# Patient Record
Sex: FEMALE | Race: WHITE | NOT HISPANIC OR LATINO | Employment: UNEMPLOYED | URBAN - METROPOLITAN AREA
[De-identification: names, ages, dates, MRNs, and addresses within clinical notes are randomized per-mention and may not be internally consistent; named-entity substitution may affect disease eponyms.]

---

## 2017-07-26 ENCOUNTER — TRANSCRIBE ORDERS (OUTPATIENT)
Dept: ADMINISTRATIVE | Facility: HOSPITAL | Age: 42
End: 2017-07-26

## 2017-07-26 DIAGNOSIS — D35.2 PITUITARY MICROADENOMA (HCC): Primary | ICD-10-CM

## 2017-07-27 ENCOUNTER — TRANSCRIBE ORDERS (OUTPATIENT)
Dept: MAMMOGRAPHY | Facility: HOSPITAL | Age: 42
End: 2017-07-27

## 2017-07-27 DIAGNOSIS — Z12.31 VISIT FOR SCREENING MAMMOGRAM: Primary | ICD-10-CM

## 2017-08-01 ENCOUNTER — HOSPITAL ENCOUNTER (OUTPATIENT)
Dept: MRI IMAGING | Facility: HOSPITAL | Age: 42
Discharge: HOME | End: 2017-08-01
Attending: OBSTETRICS & GYNECOLOGY | Admitting: OBSTETRICS & GYNECOLOGY

## 2017-08-01 DIAGNOSIS — D35.2 PITUITARY MICROADENOMA (HCC): ICD-10-CM

## 2017-08-01 PROCEDURE — A9577 INJ MULTIHANCE: HCPCS | Performed by: OBSTETRICS & GYNECOLOGY

## 2017-08-01 PROCEDURE — 0 GADOBENATE DIMEGLUMINE 529 MG/ML SOLUTION: Performed by: OBSTETRICS & GYNECOLOGY

## 2017-08-01 PROCEDURE — 70553 MRI BRAIN STEM W/O & W/DYE: CPT

## 2017-08-01 RX ADMIN — GADOBENATE DIMEGLUMINE 15 ML: 529 INJECTION, SOLUTION INTRAVENOUS at 15:45

## 2017-08-10 ENCOUNTER — HOSPITAL ENCOUNTER (OUTPATIENT)
Dept: MAMMOGRAPHY | Facility: HOSPITAL | Age: 42
Discharge: HOME | End: 2017-08-10
Attending: OBSTETRICS & GYNECOLOGY | Admitting: OBSTETRICS & GYNECOLOGY

## 2017-08-10 DIAGNOSIS — Z12.31 VISIT FOR SCREENING MAMMOGRAM: ICD-10-CM

## 2017-08-10 PROCEDURE — 77063 BREAST TOMOSYNTHESIS BI: CPT

## 2017-08-10 PROCEDURE — 77063 BREAST TOMOSYNTHESIS BI: CPT | Performed by: RADIOLOGY

## 2017-08-10 PROCEDURE — G0202 SCR MAMMO BI INCL CAD: HCPCS

## 2017-08-10 PROCEDURE — 77067 SCR MAMMO BI INCL CAD: CPT | Performed by: RADIOLOGY

## 2018-06-29 ENCOUNTER — TRANSCRIBE ORDERS (OUTPATIENT)
Dept: ADMINISTRATIVE | Facility: HOSPITAL | Age: 43
End: 2018-06-29

## 2018-06-29 DIAGNOSIS — Z12.31 VISIT FOR SCREENING MAMMOGRAM: Primary | ICD-10-CM

## 2018-07-29 PROCEDURE — 87086 URINE CULTURE/COLONY COUNT: CPT | Performed by: FAMILY MEDICINE

## 2018-07-30 ENCOUNTER — TELEPHONE (OUTPATIENT)
Dept: URGENT CARE | Facility: CLINIC | Age: 43
End: 2018-07-30

## 2018-07-31 ENCOUNTER — TELEPHONE (OUTPATIENT)
Dept: URGENT CARE | Facility: CLINIC | Age: 43
End: 2018-07-31

## 2018-08-13 ENCOUNTER — HOSPITAL ENCOUNTER (OUTPATIENT)
Dept: MAMMOGRAPHY | Facility: HOSPITAL | Age: 43
Discharge: HOME | End: 2018-08-13
Attending: OBSTETRICS & GYNECOLOGY | Admitting: OBSTETRICS & GYNECOLOGY

## 2018-08-13 DIAGNOSIS — Z12.31 VISIT FOR SCREENING MAMMOGRAM: ICD-10-CM

## 2018-08-13 PROCEDURE — 77063 BREAST TOMOSYNTHESIS BI: CPT

## 2018-08-13 PROCEDURE — 77067 SCR MAMMO BI INCL CAD: CPT | Performed by: RADIOLOGY

## 2018-08-13 PROCEDURE — 77063 BREAST TOMOSYNTHESIS BI: CPT | Performed by: RADIOLOGY

## 2018-08-13 PROCEDURE — 77067 SCR MAMMO BI INCL CAD: CPT

## 2018-08-30 PROCEDURE — 87086 URINE CULTURE/COLONY COUNT: CPT | Performed by: PHYSICIAN ASSISTANT

## 2018-09-01 ENCOUNTER — TELEPHONE (OUTPATIENT)
Dept: URGENT CARE | Facility: CLINIC | Age: 43
End: 2018-09-01

## 2019-07-05 ENCOUNTER — TRANSCRIBE ORDERS (OUTPATIENT)
Dept: ADMINISTRATIVE | Facility: HOSPITAL | Age: 44
End: 2019-07-05

## 2019-07-05 DIAGNOSIS — Z12.31 VISIT FOR SCREENING MAMMOGRAM: Primary | ICD-10-CM

## 2019-08-30 ENCOUNTER — HOSPITAL ENCOUNTER (OUTPATIENT)
Dept: MAMMOGRAPHY | Facility: HOSPITAL | Age: 44
Discharge: HOME | End: 2019-08-30
Admitting: OBSTETRICS & GYNECOLOGY

## 2019-08-30 DIAGNOSIS — Z12.31 VISIT FOR SCREENING MAMMOGRAM: ICD-10-CM

## 2019-08-30 PROCEDURE — 77063 BREAST TOMOSYNTHESIS BI: CPT | Performed by: RADIOLOGY

## 2019-08-30 PROCEDURE — 77067 SCR MAMMO BI INCL CAD: CPT | Performed by: RADIOLOGY

## 2019-08-30 PROCEDURE — 77067 SCR MAMMO BI INCL CAD: CPT

## 2019-08-30 PROCEDURE — 77063 BREAST TOMOSYNTHESIS BI: CPT

## 2020-07-16 ENCOUNTER — TRANSCRIBE ORDERS (OUTPATIENT)
Dept: ADMINISTRATIVE | Facility: HOSPITAL | Age: 45
End: 2020-07-16

## 2020-07-16 DIAGNOSIS — Z12.31 VISIT FOR SCREENING MAMMOGRAM: Primary | ICD-10-CM

## 2020-09-02 ENCOUNTER — OFFICE VISIT (OUTPATIENT)
Dept: OBSTETRICS AND GYNECOLOGY | Facility: CLINIC | Age: 45
End: 2020-09-02

## 2020-09-02 VITALS
SYSTOLIC BLOOD PRESSURE: 112 MMHG | DIASTOLIC BLOOD PRESSURE: 82 MMHG | HEIGHT: 68 IN | WEIGHT: 163 LBS | BODY MASS INDEX: 24.71 KG/M2

## 2020-09-02 DIAGNOSIS — Z01.419 WELL WOMAN EXAM: ICD-10-CM

## 2020-09-02 DIAGNOSIS — D36.9 MICROADENOMA: Primary | ICD-10-CM

## 2020-09-02 DIAGNOSIS — F41.9 ANXIETY: ICD-10-CM

## 2020-09-02 DIAGNOSIS — D35.2 PITUITARY MICROADENOMA (HCC): ICD-10-CM

## 2020-09-02 DIAGNOSIS — N94.6 DYSMENORRHEA: ICD-10-CM

## 2020-09-02 PROCEDURE — 99212 OFFICE O/P EST SF 10 MIN: CPT | Performed by: OBSTETRICS & GYNECOLOGY

## 2020-09-02 PROCEDURE — 99396 PREV VISIT EST AGE 40-64: CPT | Performed by: OBSTETRICS & GYNECOLOGY

## 2020-09-02 RX ORDER — BUPROPION HYDROCHLORIDE 100 MG/1
150 TABLET ORAL DAILY
Qty: 30 TABLET | Refills: 5 | Status: SHIPPED | OUTPATIENT
Start: 2020-09-02 | End: 2020-09-09

## 2020-09-02 RX ORDER — CABERGOLINE 0.5 MG/1
0.5 TABLET ORAL 2 TIMES WEEKLY
Qty: 60 TABLET | Refills: 5 | Status: SHIPPED | OUTPATIENT
Start: 2020-09-03 | End: 2021-09-14

## 2020-09-02 RX ORDER — IBUPROFEN 600 MG/1
600 TABLET ORAL EVERY 6 HOURS PRN
Qty: 40 TABLET | Refills: 0 | Status: SHIPPED | OUTPATIENT
Start: 2020-09-02 | End: 2021-09-23 | Stop reason: SDUPTHER

## 2020-09-02 NOTE — PROGRESS NOTES
GYN Annual Exam     CC - Here for annual exam.     Subjective   HPI  Cayla Saldana is a 44 y.o. female, , who presents for annual well woman exam. Patient's last menstrual period was 2020 (exact date)..  Periods are regular every 28-30 days, lasting 3 days.  Dysmenorrhea:mild, occurring throughout menses.  Patient reports problems with: none.  Partner Status: Marital Status: .  New Partners since last visit: no.  Desires STD Screening: no.    Patient requests RF on ibuprofen 600mg, dostinex and wellbutrin today. She is taking compouned PO progesterone per outside provider.     Additional OB/GYN History   Current contraception: contraceptive methods: Vasectomy   Desires to: do not start contraception  Last Pap :   Last Completed Pap Smear       Status Date      PAP SMEAR Done 2020 negative        History of abnormal Pap smear: yes - h/o HSIL, colpo  and , pt with h/o LEEP   Family history of uterine, colon, breast, or ovarian cancer: yes - maternal aunt with h/o breast CA at age 35  Performs monthly Self-Breast Exam: yes  Last mammogram:   Last Completed Mammogram       Status Date      MAMMOGRAM Done 2019 MAMMO SCREENING DIGITAL TOMOSYNTHESIS BILATERAL W CAD     Patient has more history with this topic...         Exercises Regularly: yes  Feelings of Anxiety or Depression: yes - controlled with wellbutrin  Tobacco Usage?: No   OB History        3    Para   3    Term   3            AB        Living           SAB        TAB        Ectopic        Molar        Multiple        Live Births                    Health Maintenance   Topic Date Due   • Annual Gynecologic Pelvic and Breast Exam  1974   • ANNUAL PHYSICAL  10/31/1977   • TDAP/TD VACCINES (1 - Tdap) 10/31/1985   • INFLUENZA VACCINE  2020   • HEPATITIS C SCREENING  2020   • MAMMOGRAM  2020   • COLONOSCOPY  2020   • PAP SMEAR  2023       The additional following portions  "of the patient's history were reviewed and updated as appropriate: allergies, current medications, past family history, past medical history, past social history, past surgical history and problem list.    Review of Systems   Constitutional: Negative.    HENT: Negative.    Eyes: Negative.    Respiratory: Negative.    Cardiovascular: Negative.    Gastrointestinal: Negative.    Endocrine: Negative.    Genitourinary: Negative.    Musculoskeletal: Negative.    Skin: Negative.    Allergic/Immunologic: Negative.    Neurological: Negative.    Hematological: Negative.    Psychiatric/Behavioral: Negative.        I have reviewed and agree with the HPI, ROS, and historical information as entered above. Ashlee Panda MD    Objective   /82   Ht 172.7 cm (68\")   Wt 73.9 kg (163 lb)   LMP 08/23/2020 (Exact Date)   Breastfeeding No   BMI 24.78 kg/m²     Physical Exam   Constitutional: She is oriented to person, place, and time. She appears well-developed and well-nourished.   HENT:   Head: Normocephalic and atraumatic.   Neck: Normal range of motion. No muscular tenderness present. No thyroid mass and no thyromegaly present.   Cardiovascular: Normal rate and regular rhythm.   No murmur heard.  Pulmonary/Chest: Effort normal and breath sounds normal. She has no wheezes. She has no rhonchi. She has no rales. She exhibits no mass, no tenderness and no retraction. Right breast exhibits no mass, no nipple discharge, no skin change and no tenderness. Left breast exhibits no mass, no nipple discharge, no skin change and no tenderness.   Abdominal: Soft. Bowel sounds are normal. She exhibits no mass. There is no tenderness. There is no rigidity and no guarding. No hernia. Hernia confirmed negative in the right inguinal area and confirmed negative in the left inguinal area.   Genitourinary: Vagina normal, uterus normal and cervix normal. Rectal exam shows no external hemorrhoid. There is no rash, tenderness or lesion on " the right labia. There is no rash, tenderness or lesion on the left labia. Right adnexum displays no mass and no tenderness. Left adnexum displays no mass and no tenderness. Vagina exhibits no lesion. No vaginal discharge found.   Genitourinary Comments: Chaperone Present   Lymphadenopathy:        Right: No inguinal adenopathy present.        Left: No inguinal adenopathy present.   Neurological: She is alert and oriented to person, place, and time.   Psychiatric: She has a normal mood and affect. Her behavior is normal.   Nursing note and vitals reviewed.      Assessment/Plan     Assessment     Problem List Items Addressed This Visit        Endocrine    Pituitary microadenoma (CMS/HCC)    Overview     On dostinex. Stable. Prolactin checked 9/2/20         Relevant Medications    cabergoline (DOSTINEX) 0.5 MG tablet (Start on 9/3/2020)       Nervous and Auditory    Dysmenorrhea    Overview     Uses ibuprofen. Understands risks of overuse.          Relevant Medications    ibuprofen (ADVIL,MOTRIN) 600 MG tablet       Other    Anxiety    Overview     Stable on well butrin           Other Visit Diagnoses     Microadenoma    -  Primary    Relevant Orders    Prolactin    Well woman exam        Relevant Orders    Pap IG, Rfx HPV ASCU    Mammo Screening Digital Tomosynthesis Bilateral With CAD          1. GYN annual well woman exam.     2.  Microadenoma  Plan   1. With her microadenoma last MRI was in 2015 that did not show microadenoma at that time.  She has maintained on Dostinex without symptoms.  Her prolactin will be checked today.  1. Recommended use of Vitamin D replacement and getting adequate calcium in her diet. (1500mg)  2. Reviewed monthly self breast exams.  Instructed to call with lumps, pain, or breast discharge.    3. Ordered Mammogram today  4. Reviewed exercise as a preventative health measures.   5. RTC in 1 year or PRN with problems.  6. Symptoms of menopausal transition reviewed with patient.       Ashlee  Cally Panda MD  09/02/2020

## 2020-09-03 LAB — PROLACTIN SERPL-MCNC: 7.3 NG/ML (ref 4.8–23.3)

## 2020-09-09 RX ORDER — BUPROPION HYDROCHLORIDE 150 MG/1
150 TABLET, EXTENDED RELEASE ORAL DAILY
Qty: 30 TABLET | Refills: 1 | Status: SHIPPED | OUTPATIENT
Start: 2020-09-09 | End: 2020-10-02 | Stop reason: SDUPTHER

## 2020-09-09 RX ORDER — BUPROPION HYDROCHLORIDE 150 MG/1
1 TABLET, EXTENDED RELEASE ORAL DAILY
COMMUNITY
Start: 2020-08-07 | End: 2020-09-09 | Stop reason: SDUPTHER

## 2020-09-09 NOTE — TELEPHONE ENCOUNTER
Pt called requesting this medication, states we sent in wellbutrin 100mg and that was wrong, states she needs the 150mg SR. Last seen 12/10/2019 -

## 2020-09-21 DIAGNOSIS — Z01.419 WELL WOMAN EXAM: ICD-10-CM

## 2020-10-02 RX ORDER — BUPROPION HYDROCHLORIDE 150 MG/1
150 TABLET, EXTENDED RELEASE ORAL DAILY
Qty: 30 TABLET | Refills: 1 | Status: SHIPPED | OUTPATIENT
Start: 2020-10-02 | End: 2021-01-04

## 2020-10-17 DIAGNOSIS — D35.2 PITUITARY MICROADENOMA (HCC): ICD-10-CM

## 2020-10-19 RX ORDER — CABERGOLINE 0.5 MG/1
TABLET ORAL
Qty: 8 TABLET | Refills: 4 | OUTPATIENT
Start: 2020-10-19

## 2020-10-30 ENCOUNTER — HOSPITAL ENCOUNTER (OUTPATIENT)
Dept: MAMMOGRAPHY | Facility: HOSPITAL | Age: 45
Discharge: HOME | End: 2020-10-30
Admitting: OBSTETRICS & GYNECOLOGY

## 2020-10-30 DIAGNOSIS — Z12.31 VISIT FOR SCREENING MAMMOGRAM: ICD-10-CM

## 2020-10-30 PROCEDURE — 77063 BREAST TOMOSYNTHESIS BI: CPT | Performed by: RADIOLOGY

## 2020-10-30 PROCEDURE — 77067 SCR MAMMO BI INCL CAD: CPT | Performed by: RADIOLOGY

## 2020-10-30 PROCEDURE — 77067 SCR MAMMO BI INCL CAD: CPT

## 2020-10-30 PROCEDURE — 77063 BREAST TOMOSYNTHESIS BI: CPT

## 2020-12-30 ENCOUNTER — TRANSCRIBE ORDERS (OUTPATIENT)
Dept: ADMINISTRATIVE | Facility: HOSPITAL | Age: 45
End: 2020-12-30

## 2020-12-30 DIAGNOSIS — Z12.31 VISIT FOR SCREENING MAMMOGRAM: Primary | ICD-10-CM

## 2021-01-04 RX ORDER — BUPROPION HYDROCHLORIDE 150 MG/1
TABLET, EXTENDED RELEASE ORAL
Qty: 30 TABLET | Refills: 8 | Status: SHIPPED | OUTPATIENT
Start: 2021-01-04 | End: 2021-09-13

## 2021-07-08 NOTE — TELEPHONE ENCOUNTER
Pt called saying her abx  Macrobid is giving her diarrhea. Wants to know if we can change her abx.   Use Map Statement For Sites (Optional): No

## 2021-09-13 DIAGNOSIS — D35.2 PITUITARY MICROADENOMA (HCC): ICD-10-CM

## 2021-09-13 RX ORDER — BUPROPION HYDROCHLORIDE 150 MG/1
TABLET, EXTENDED RELEASE ORAL
Qty: 30 TABLET | Refills: 0 | Status: SHIPPED | OUTPATIENT
Start: 2021-09-13 | End: 2021-09-23 | Stop reason: SDUPTHER

## 2021-09-14 RX ORDER — CABERGOLINE 0.5 MG/1
0.5 TABLET ORAL 2 TIMES WEEKLY
Qty: 8 TABLET | Refills: 0 | Status: SHIPPED | OUTPATIENT
Start: 2021-09-16 | End: 2021-09-23 | Stop reason: SDUPTHER

## 2021-09-23 ENCOUNTER — OFFICE VISIT (OUTPATIENT)
Dept: OBSTETRICS AND GYNECOLOGY | Facility: CLINIC | Age: 46
End: 2021-09-23

## 2021-09-23 VITALS
SYSTOLIC BLOOD PRESSURE: 101 MMHG | DIASTOLIC BLOOD PRESSURE: 59 MMHG | BODY MASS INDEX: 22.31 KG/M2 | WEIGHT: 147.2 LBS | HEIGHT: 68 IN

## 2021-09-23 DIAGNOSIS — N94.6 DYSMENORRHEA: ICD-10-CM

## 2021-09-23 DIAGNOSIS — Z12.31 BREAST CANCER SCREENING BY MAMMOGRAM: ICD-10-CM

## 2021-09-23 DIAGNOSIS — F41.9 ANXIETY: ICD-10-CM

## 2021-09-23 DIAGNOSIS — D35.2 PITUITARY MICROADENOMA (HCC): ICD-10-CM

## 2021-09-23 DIAGNOSIS — Z01.419 WOMEN'S ANNUAL ROUTINE GYNECOLOGICAL EXAMINATION: ICD-10-CM

## 2021-09-23 DIAGNOSIS — N92.6 MENSTRUAL IRREGULARITY: ICD-10-CM

## 2021-09-23 PROCEDURE — 99396 PREV VISIT EST AGE 40-64: CPT | Performed by: OBSTETRICS & GYNECOLOGY

## 2021-09-23 RX ORDER — CABERGOLINE 0.5 MG/1
0.5 TABLET ORAL 2 TIMES WEEKLY
Qty: 8 TABLET | Refills: 12 | Status: SHIPPED | OUTPATIENT
Start: 2021-09-23 | End: 2022-09-27 | Stop reason: SDUPTHER

## 2021-09-23 RX ORDER — BUPROPION HYDROCHLORIDE 150 MG/1
150 TABLET, EXTENDED RELEASE ORAL DAILY
Qty: 30 TABLET | Refills: 11 | Status: SHIPPED | OUTPATIENT
Start: 2021-09-23 | End: 2022-08-11

## 2021-09-23 RX ORDER — IBUPROFEN 600 MG/1
600 TABLET ORAL EVERY 6 HOURS PRN
Qty: 40 TABLET | Refills: 1 | Status: SHIPPED | OUTPATIENT
Start: 2021-09-23 | End: 2022-09-27 | Stop reason: SDUPTHER

## 2021-09-23 RX ORDER — LEVOTHYROXINE, LIOTHYRONINE 19; 4.5 UG/1; UG/1
TABLET ORAL
COMMUNITY
Start: 2021-09-20

## 2021-09-23 RX ORDER — TRETINOIN 0.1 %
CREAM (GRAM) TOPICAL
COMMUNITY
Start: 2021-09-07

## 2021-09-23 NOTE — PROGRESS NOTES
GYN Annual Exam     CC - Here for annual exam.        HPI  Cayla Saldana is a 45 y.o. female, , who presents for annual well woman exam. Patient's last menstrual period was 2021..  Periods are irregular occurring every 3-8 weeks, lasting 4 days. .  Dysmenorrhea:moderate, occurring first 1-2 days of flow.  Patient reports problems with: irregular periods over the last year.  Periods have ranged from 20 to 60 days.  She is seeing Guerita Huff for HRT.  She is on Testerone Cream and Progesterone PO  . Since her last visit she has started on Testerone Cream. Partner Status: Marital Status: .  New Partners since last visit: no.  Desires STD Screening: no.    Additional OB/GYN History   Current contraception: contraceptive methods: Vasectomy   Desires to: do not start contraception  Last Pap :   Last Completed Pap Smear          Ordered - PAP SMEAR (Every 3 Years) Ordered on 2020  Pap IG, Rfx HPV ASCU    2020  Done - negative              History of abnormal Pap smear: yes - LEEP 2014  Family history of uterine, colon, breast, or ovarian cancer: yes - Breast cancer maternal aunt  Performs monthly Self-Breast Exam: yes  Last mammogram: 10/30/20. Done at .    Last Completed Mammogram          Scheduled - MAMMOGRAM (Yearly) Scheduled for 2021    10/30/2020  Mammo Screening Digital Tomosynthesis Bilateral With CAD    2019  Mammo Screening Digital Tomosynthesis Bilateral With CAD    2018  Mammo Screening Digital Tomosynthesis Bilateral With CAD    08/10/2017  Mammo Screening Digital Tomosynthesis Bilateral With CAD    2016  Mammo screening bilateral               Exercises Regularly: yes  Feelings of Anxiety or Depression: no  Tobacco Usage?: No   OB History        3    Para   3    Term   3            AB        Living   3       SAB        TAB        Ectopic        Molar        Multiple        Live Births                    Health  "Maintenance   Topic Date Due   • COLORECTAL CANCER SCREENING  Never done   • ANNUAL PHYSICAL  Never done   • TDAP/TD VACCINES (1 - Tdap) Never done   • HEPATITIS C SCREENING  Never done   • Annual Gynecologic Pelvic and Breast Exam  09/03/2021   • INFLUENZA VACCINE  10/01/2021   • MAMMOGRAM  10/30/2021   • PAP SMEAR  09/02/2023   • COVID-19 Vaccine  Completed   • Pneumococcal Vaccine 0-64  Aged Out       The additional following portions of the patient's history were reviewed and updated as appropriate: allergies, current medications, past family history, past medical history, past social history, past surgical history and problem list.    Review of Systems   Constitutional: Negative.    HENT: Negative.    Eyes: Negative.    Respiratory: Negative.    Cardiovascular: Negative.    Gastrointestinal: Negative.    Endocrine: Negative.    Genitourinary: Positive for menstrual problem.   Musculoskeletal: Negative.    Skin: Negative.    Allergic/Immunologic: Negative.    Neurological: Negative.    Hematological: Negative.    Psychiatric/Behavioral: Negative.          I have reviewed and agree with the HPI, ROS, and historical information as entered above. Ashlee Panda MD    Objective   /59   Ht 172.7 cm (68\")   Wt 66.8 kg (147 lb 3.2 oz)   LMP 08/30/2021   BMI 22.38 kg/m²     Physical Exam  Vitals and nursing note reviewed. Exam conducted with a chaperone present.   Constitutional:       Appearance: She is well-developed.   HENT:      Head: Normocephalic and atraumatic.   Neck:      Thyroid: No thyroid mass or thyromegaly.   Cardiovascular:      Rate and Rhythm: Normal rate and regular rhythm.      Heart sounds: No murmur heard.      Pulmonary:      Effort: Pulmonary effort is normal. No retractions.      Breath sounds: Normal breath sounds. No wheezing, rhonchi or rales.   Chest:      Chest wall: No mass or tenderness.   Breasts:      Right: Normal. No mass, nipple discharge, skin change or tenderness. "      Left: Normal. No mass, nipple discharge, skin change or tenderness.       Abdominal:      General: Bowel sounds are normal.      Palpations: Abdomen is soft. Abdomen is not rigid. There is no mass.      Tenderness: There is no abdominal tenderness. There is no guarding.      Hernia: No hernia is present. There is no hernia in the left inguinal area or right inguinal area.   Genitourinary:     General: Normal vulva.      Exam position: Lithotomy position.      Pubic Area: No rash.       Labia:         Right: No rash, tenderness or lesion.         Left: No rash, tenderness or lesion.       Urethra: No urethral pain or urethral swelling.      Vagina: Normal. No vaginal discharge or lesions.      Cervix: No cervical motion tenderness, discharge, lesion or cervical bleeding.      Uterus: Normal. Not enlarged, not fixed and not tender.       Adnexa:         Right: No mass, tenderness or fullness.          Left: No mass, tenderness or fullness.        Rectum: No external hemorrhoid.   Musculoskeletal:      Cervical back: Normal range of motion. No muscular tenderness.   Neurological:      Mental Status: She is alert and oriented to person, place, and time.   Psychiatric:         Behavior: Behavior normal.            Assessment and Plan    Problem List Items Addressed This Visit        Genitourinary and Reproductive     Dysmenorrhea    Overview     Uses ibuprofen. Understands risks of overuse.          Relevant Medications    ibuprofen (ADVIL,MOTRIN) 600 MG tablet    Menstrual irregularity    Overview     Most likely menopausal transition, however patient has a history of microadenoma.  We will test MRI to make sure it is not worsening and prolactin level.  We will also draw labs for menopausal transition along with thyroid testing.         Relevant Orders    TSH    Follicle Stimulating Hormone    Estradiol       Hematology and Neoplasia    Pituitary microadenoma (CMS/HCC)    Overview     On dostinex. Stable. Prolactin  checked 9/2/20         Relevant Orders    MRI Brain With & Without Contrast    Comprehensive Metabolic Panel       Mental Health    Anxiety    Overview     Stable on well butrin         Relevant Medications    buPROPion SR (WELLBUTRIN SR) 150 MG 12 hr tablet      Other Visit Diagnoses     Women's annual routine gynecological examination        Relevant Orders    Pap IG, HPV-hr    Breast cancer screening by mammogram        Relevant Orders    Mammo Screening Digital Tomosynthesis Bilateral With CAD          1. GYN annual well woman exam.   2. Reviewed monthly self breast exams.  Instructed to call with lumps, pain, or breast discharge.    3. Ordered Mammogram today  4. Recommended use of Vitamin D replacement and getting adequate calcium in her diet. (1500mg)  5. Reviewed exercise as a preventative health measures.   6. Symptoms of menopausal transition reviewed with patient.   7. RTC in 1 year or PRN with problems.  8. No follow-ups on file.     Ashlee Panda MD  09/23/2021

## 2021-09-24 LAB
ESTRADIOL SERPL-MCNC: 258 PG/ML
FSH SERPL-ACNC: 10 MIU/ML
PROLACTIN SERPL-MCNC: 11.8 NG/ML (ref 4.8–23.3)
TSH SERPL DL<=0.005 MIU/L-ACNC: 0.21 UIU/ML (ref 0.27–4.2)

## 2021-09-27 DIAGNOSIS — E03.8 TSH DEFICIENCY: Primary | ICD-10-CM

## 2021-09-27 DIAGNOSIS — Z12.11 ENCOUNTER FOR SCREENING COLONOSCOPY: Primary | ICD-10-CM

## 2021-09-27 DIAGNOSIS — D35.2 PITUITARY MICROADENOMA (HCC): ICD-10-CM

## 2021-09-30 DIAGNOSIS — Z01.419 WOMEN'S ANNUAL ROUTINE GYNECOLOGICAL EXAMINATION: ICD-10-CM

## 2021-10-16 ENCOUNTER — HOSPITAL ENCOUNTER (OUTPATIENT)
Dept: MRI IMAGING | Facility: HOSPITAL | Age: 46
Discharge: HOME | End: 2021-10-16
Admitting: OBSTETRICS & GYNECOLOGY

## 2021-10-16 DIAGNOSIS — D35.2 PITUITARY MICROADENOMA (HCC): ICD-10-CM

## 2021-10-16 PROCEDURE — 0 GADOBENATE DIMEGLUMINE 529 MG/ML SOLUTION: Performed by: OBSTETRICS & GYNECOLOGY

## 2021-10-16 PROCEDURE — 70553 MRI BRAIN STEM W/O & W/DYE: CPT

## 2021-10-16 PROCEDURE — A9577 INJ MULTIHANCE: HCPCS | Performed by: OBSTETRICS & GYNECOLOGY

## 2021-10-16 RX ADMIN — GADOBENATE DIMEGLUMINE 13 ML: 529 INJECTION, SOLUTION INTRAVENOUS at 15:59

## 2021-11-01 ENCOUNTER — HOSPITAL ENCOUNTER (OUTPATIENT)
Dept: MAMMOGRAPHY | Facility: HOSPITAL | Age: 46
Discharge: HOME | End: 2021-11-01
Admitting: OBSTETRICS & GYNECOLOGY

## 2021-11-01 DIAGNOSIS — Z12.31 VISIT FOR SCREENING MAMMOGRAM: ICD-10-CM

## 2021-11-01 PROCEDURE — 77067 SCR MAMMO BI INCL CAD: CPT | Performed by: RADIOLOGY

## 2021-11-01 PROCEDURE — 77067 SCR MAMMO BI INCL CAD: CPT

## 2021-11-01 PROCEDURE — 77063 BREAST TOMOSYNTHESIS BI: CPT

## 2021-11-01 PROCEDURE — 77063 BREAST TOMOSYNTHESIS BI: CPT | Performed by: RADIOLOGY

## 2021-11-19 RX ORDER — SODIUM, POTASSIUM,MAG SULFATES 17.5-3.13G
2 SOLUTION, RECONSTITUTED, ORAL ORAL TAKE AS DIRECTED
Qty: 354 ML | Refills: 0 | OUTPATIENT
Start: 2021-11-19 | End: 2022-08-09

## 2021-12-02 ENCOUNTER — OUTSIDE FACILITY SERVICE (OUTPATIENT)
Dept: GASTROENTEROLOGY | Facility: CLINIC | Age: 46
End: 2021-12-02

## 2021-12-02 PROCEDURE — 45378 DIAGNOSTIC COLONOSCOPY: CPT | Performed by: INTERNAL MEDICINE

## 2022-08-11 ENCOUNTER — DOCUMENTATION (OUTPATIENT)
Dept: OBSTETRICS AND GYNECOLOGY | Facility: CLINIC | Age: 47
End: 2022-08-11

## 2022-08-11 DIAGNOSIS — F41.9 ANXIETY: ICD-10-CM

## 2022-08-11 RX ORDER — BUPROPION HYDROCHLORIDE 150 MG/1
TABLET, EXTENDED RELEASE ORAL
Qty: 60 TABLET | Refills: 0 | Status: SHIPPED | OUTPATIENT
Start: 2022-08-11 | End: 2022-09-27 | Stop reason: SDUPTHER

## 2022-08-11 NOTE — PROGRESS NOTES
Patients last appt 9/23/2021, she is scheduled for an appt 9/27/2022. Refilled medication until appt.     LAN Sesay

## 2022-09-27 ENCOUNTER — OFFICE VISIT (OUTPATIENT)
Dept: OBSTETRICS AND GYNECOLOGY | Facility: CLINIC | Age: 47
End: 2022-09-27

## 2022-09-27 VITALS
SYSTOLIC BLOOD PRESSURE: 102 MMHG | HEIGHT: 68 IN | DIASTOLIC BLOOD PRESSURE: 78 MMHG | WEIGHT: 133.4 LBS | BODY MASS INDEX: 20.22 KG/M2

## 2022-09-27 DIAGNOSIS — N94.6 DYSMENORRHEA: ICD-10-CM

## 2022-09-27 DIAGNOSIS — Z01.419 WOMEN'S ANNUAL ROUTINE GYNECOLOGICAL EXAMINATION: Primary | ICD-10-CM

## 2022-09-27 DIAGNOSIS — D35.2 PITUITARY MICROADENOMA: ICD-10-CM

## 2022-09-27 DIAGNOSIS — Z12.31 BREAST CANCER SCREENING BY MAMMOGRAM: ICD-10-CM

## 2022-09-27 DIAGNOSIS — F41.9 ANXIETY: ICD-10-CM

## 2022-09-27 PROCEDURE — 99396 PREV VISIT EST AGE 40-64: CPT | Performed by: OBSTETRICS & GYNECOLOGY

## 2022-09-27 RX ORDER — CABERGOLINE 0.5 MG/1
0.5 TABLET ORAL 2 TIMES WEEKLY
Qty: 24 TABLET | Refills: 4 | Status: SHIPPED | OUTPATIENT
Start: 2022-09-29

## 2022-09-27 RX ORDER — IBUPROFEN 600 MG/1
600 TABLET ORAL EVERY 6 HOURS PRN
Qty: 40 TABLET | Refills: 1 | Status: SHIPPED | OUTPATIENT
Start: 2022-09-27

## 2022-09-27 RX ORDER — BUPROPION HYDROCHLORIDE 150 MG/1
150 TABLET, EXTENDED RELEASE ORAL DAILY
Qty: 90 TABLET | Refills: 4 | Status: SHIPPED | OUTPATIENT
Start: 2022-09-27

## 2022-09-27 NOTE — PROGRESS NOTES
Gynecologic Annual Exam Note          GYN Annual Exam     Gynecologic Exam (Annual )        Subjective     HPI  Cayla Saldana is a 46 y.o. female, , who presents for annual well woman exam as a established patient . Patient's last menstrual period was 2022..  Periods are regular every 25-35 days, lasting 3-4 days. The flow is moderate. Dysmenorrhea:moderate, occurring first 1-2 days of flow. . Patient reports problems with: none.  Partner Status: Marital Status: . She is is sexually active. She has not had new partners.. STD testing recommendations have been explained to the patient and she does not desire STD testing. There were no changes to her medical or surgical history since her last visit..       Additional OB/GYN History   Current contraception: contraceptive methods: Vasectomy   Desires to: do not start contraception    Last Pap : 2021. Result: negative. HPV: negative  Last Completed Pap Smear          Ordered - PAP SMEAR (Every 3 Years) Ordered on 2021  SCANNED - PAP SMEAR    2020  Pap IG, Rfx HPV ASCU    2020  Done - negative              History of abnormal Pap smear: yes - LEEP 2014  Family history of uterine, colon, breast, or ovarian cancer: yes - Maternal Aunt- Breast cancer  Performs monthly Self-Breast Exam: yes  Last mammogram: 2021. Done at .    Last Completed Mammogram          MAMMOGRAM (Yearly) Order placed this encounter    2021  Mammo Screening Digital Tomosynthesis Bilateral With CAD    10/30/2020  Mammo Screening Digital Tomosynthesis Bilateral With CAD    2019  Mammo Screening Digital Tomosynthesis Bilateral With CAD    2018  Mammo Screening Digital Tomosynthesis Bilateral With CAD    08/10/2017  Mammo Screening Digital Tomosynthesis Bilateral With CAD    Only the first 5 history entries have been loaded, but more history exists.                History of abnormal mammogram: no    Colonoscopy: has  had a colonoscopy 8 month(s) ago.  Exercises Regularly: yes  Feelings of Anxiety or Depression: no  Tobacco Usage?: No       Current Outpatient Medications:   •  buPROPion SR (WELLBUTRIN SR) 150 MG 12 hr tablet, Take 1 tablet by mouth Daily., Disp: 90 tablet, Rfl: 4  •  [START ON 2022] cabergoline (DOSTINEX) 0.5 MG tablet, Take 1 tablet by mouth 2 (Two) Times a Week., Disp: 24 tablet, Rfl: 4  •  fluticasone (FLONASE) 50 MCG/ACT nasal spray, 2 sprays into the nostril(s) as directed by provider Daily., Disp: 16 g, Rfl: 0  •  ibuprofen (ADVIL,MOTRIN) 600 MG tablet, Take 1 tablet by mouth Every 6 (Six) Hours As Needed for Mild Pain., Disp: 40 tablet, Rfl: 1  •  Loratadine 10 MG capsule, Take 10 mg/day by mouth daily., Disp: , Rfl:   •  NP Thyroid 30 MG tablet, , Disp: , Rfl:   •  Progesterone Micronized (PROGESTERONE PO), Take 250 mg by mouth., Disp: , Rfl:   •  Retin-A 0.1 % cream, APPLY TOPICALLY AT BEDTIME, Disp: , Rfl:   •  Testosterone 20 % cream, 4 mg., Disp: , Rfl:      Patient is requesting refills of Wellbutrin, Dostinex, and Ibuprofen.    OB History        3    Para   3    Term   3            AB        Living   3       SAB        IAB        Ectopic        Molar        Multiple        Live Births                    Past Medical History:   Diagnosis Date   • Abnormal Pap smear of cervix    • Environmental and seasonal allergies    • History of abnormal cervical Pap smear      ASCUS non 16/18 +;   ASCUS non 16/18 +   • Ovarian cyst    • Pituitary microadenoma (HCC)    • Thyroid disease    • Varicella         Past Surgical History:   Procedure Laterality Date   • COLPOSCOPY       and    • DENTAL PROCEDURE     • DIAGNOSTIC LAPAROSCOPY     • DILATATION AND CURETTAGE     • LEEP     • OTHER SURGICAL HISTORY      MARU RODRIGUEZ   • VAGINAL DELIVERY         Health Maintenance   Topic Date Due   • ANNUAL PHYSICAL  Never done   • TDAP/TD VACCINES (1 - Tdap) Never done   •  "HEPATITIS C SCREENING  Never done   • COVID-19 Vaccine (3 - Booster for Devon series) 02/06/2022   • Annual Gynecologic Pelvic and Breast Exam  09/24/2022   • INFLUENZA VACCINE  10/01/2022   • MAMMOGRAM  11/01/2022   • PAP SMEAR  09/23/2024   • COLORECTAL CANCER SCREENING  12/03/2031   • Pneumococcal Vaccine 0-64  Aged Out       The additional following portions of the patient's history were reviewed and updated as appropriate: allergies, current medications, past family history, past medical history, past social history, past surgical history and problem list.    Review of Systems   Constitutional: Negative.    HENT: Negative.    Eyes: Negative.    Respiratory: Negative.    Cardiovascular: Negative.    Gastrointestinal: Negative.    Endocrine: Negative.    Genitourinary: Negative.    Musculoskeletal: Negative.    Skin: Negative.    Allergic/Immunologic: Negative.    Neurological: Negative.    Hematological: Negative.    Psychiatric/Behavioral: Negative.          I have reviewed and agree with the HPI, ROS, and historical information as entered above. Ashlee Panda MD      Objective   /78   Ht 172.7 cm (68\")   Wt 60.5 kg (133 lb 6.4 oz)   LMP 09/02/2022   BMI 20.28 kg/m²     Physical Exam  Vitals and nursing note reviewed. Exam conducted with a chaperone present.   Constitutional:       Appearance: She is well-developed.   HENT:      Head: Normocephalic and atraumatic.   Neck:      Thyroid: No thyroid mass or thyromegaly.   Cardiovascular:      Rate and Rhythm: Normal rate and regular rhythm.      Heart sounds: No murmur heard.  Pulmonary:      Effort: Pulmonary effort is normal. No retractions.      Breath sounds: Normal breath sounds. No wheezing, rhonchi or rales.   Chest:      Chest wall: No mass or tenderness.   Breasts:      Right: Normal. No mass, nipple discharge, skin change or tenderness.      Left: Normal. No mass, nipple discharge, skin change or tenderness.       Abdominal:      " General: Bowel sounds are normal.      Palpations: Abdomen is soft. Abdomen is not rigid. There is no mass.      Tenderness: There is no abdominal tenderness. There is no guarding.      Hernia: No hernia is present. There is no hernia in the left inguinal area or right inguinal area.   Genitourinary:     General: Normal vulva.      Exam position: Lithotomy position.      Pubic Area: No rash.       Labia:         Right: No rash, tenderness or lesion.         Left: No rash, tenderness or lesion.       Urethra: No urethral pain or urethral swelling.      Vagina: Normal. No vaginal discharge or lesions.      Cervix: No cervical motion tenderness, discharge, lesion or cervical bleeding.      Uterus: Normal. Not enlarged, not fixed and not tender.       Adnexa:         Right: No mass, tenderness or fullness.          Left: No mass, tenderness or fullness.        Rectum: No external hemorrhoid.   Musculoskeletal:      Cervical back: Normal range of motion. No muscular tenderness.   Neurological:      Mental Status: She is alert and oriented to person, place, and time.   Psychiatric:         Behavior: Behavior normal.            Assessment and Plan    Problem List Items Addressed This Visit        Genitourinary and Reproductive     Dysmenorrhea    Overview     Uses ibuprofen. Understands risks of overuse.          Relevant Medications    ibuprofen (ADVIL,MOTRIN) 600 MG tablet       Hematology and Neoplasia    Pituitary microadenoma (HCC)    Overview     On dostinex. Stable. Prolactin checked 9/2/20  MRI in October 2021-negative for adenoma.         Relevant Medications    cabergoline (DOSTINEX) 0.5 MG tablet (Start on 9/29/2022)       Mental Health    Anxiety    Overview     Stable on well butrin         Relevant Medications    buPROPion SR (WELLBUTRIN SR) 150 MG 12 hr tablet      Other Visit Diagnoses     Women's annual routine gynecological examination    -  Primary    Breast cancer screening by mammogram        Relevant  Orders    Mammo Screening Digital Tomosynthesis Bilateral With CAD          1. GYN annual well woman exam.   2. Pap guidelines reviewed.  3. Encouraged use of condoms for STD prevention.  4. Reviewed risks/benefits of hormonal contraception after age 35, including possible increased risk of breast cancer, heart disease, blood clots and strokes.  Patient strongly desires to stay on hormonal contraception.  5. Ordered Mammogram today  6. Recommended use of Vitamin D replacement and getting adequate calcium in her diet. (1500mg)  7. Reviewed exercise as a preventative health measures.   8. Reccommended Flu Vaccine in Fall of each year.  9. Symptoms of menopausal transition reviewed with patient.   10. RTC in 1 year or PRN with problems.  11. Return in about 1 year (around 9/27/2023) for Annual physical.     Ashlee Panda MD  09/27/2022

## 2022-12-01 ENCOUNTER — TRANSCRIBE ORDERS (OUTPATIENT)
Dept: ADMINISTRATIVE | Facility: HOSPITAL | Age: 47
End: 2022-12-01

## 2022-12-01 DIAGNOSIS — Z12.31 VISIT FOR SCREENING MAMMOGRAM: Primary | ICD-10-CM

## 2023-01-11 ENCOUNTER — HOSPITAL ENCOUNTER (OUTPATIENT)
Dept: MAMMOGRAPHY | Facility: HOSPITAL | Age: 48
Discharge: HOME | End: 2023-01-11
Admitting: OBSTETRICS & GYNECOLOGY
Payer: MEDICAID

## 2023-01-11 DIAGNOSIS — Z12.31 VISIT FOR SCREENING MAMMOGRAM: ICD-10-CM

## 2023-01-11 PROCEDURE — 77063 BREAST TOMOSYNTHESIS BI: CPT | Performed by: RADIOLOGY

## 2023-01-11 PROCEDURE — 77063 BREAST TOMOSYNTHESIS BI: CPT

## 2023-01-11 PROCEDURE — 77067 SCR MAMMO BI INCL CAD: CPT | Performed by: RADIOLOGY

## 2023-01-11 PROCEDURE — 77067 SCR MAMMO BI INCL CAD: CPT

## 2023-05-11 DIAGNOSIS — N94.6 DYSMENORRHEA: ICD-10-CM

## 2023-05-11 RX ORDER — IBUPROFEN 600 MG/1
600 TABLET ORAL EVERY 6 HOURS PRN
Qty: 40 TABLET | Refills: 1 | OUTPATIENT
Start: 2023-05-11

## 2023-05-12 DIAGNOSIS — N92.0 MENORRHAGIA WITH REGULAR CYCLE: Primary | ICD-10-CM

## 2023-09-28 ENCOUNTER — OFFICE VISIT (OUTPATIENT)
Dept: OBSTETRICS AND GYNECOLOGY | Facility: CLINIC | Age: 48
End: 2023-09-28
Payer: MEDICAID

## 2023-09-28 VITALS
DIASTOLIC BLOOD PRESSURE: 76 MMHG | BODY MASS INDEX: 20.92 KG/M2 | WEIGHT: 138 LBS | HEIGHT: 68 IN | SYSTOLIC BLOOD PRESSURE: 106 MMHG

## 2023-09-28 DIAGNOSIS — D35.2 PITUITARY MICROADENOMA: ICD-10-CM

## 2023-09-28 DIAGNOSIS — Z01.419 WOMEN'S ANNUAL ROUTINE GYNECOLOGICAL EXAMINATION: Primary | ICD-10-CM

## 2023-09-28 DIAGNOSIS — F41.9 ANXIETY: ICD-10-CM

## 2023-09-28 DIAGNOSIS — N94.6 DYSMENORRHEA: ICD-10-CM

## 2023-09-28 DIAGNOSIS — Z87.42 HISTORY OF ABNORMAL CERVICAL PAP SMEAR: ICD-10-CM

## 2023-09-28 DIAGNOSIS — N92.6 MENSTRUAL IRREGULARITY: ICD-10-CM

## 2023-09-28 RX ORDER — BUPROPION HYDROCHLORIDE 150 MG/1
150 TABLET, EXTENDED RELEASE ORAL DAILY
Qty: 90 TABLET | Refills: 4 | Status: SHIPPED | OUTPATIENT
Start: 2023-09-28

## 2023-09-28 RX ORDER — IBUPROFEN 600 MG/1
600 TABLET ORAL EVERY 6 HOURS PRN
Qty: 40 TABLET | Refills: 1 | Status: SHIPPED | OUTPATIENT
Start: 2023-09-28

## 2023-09-28 RX ORDER — AZELASTINE HYDROCHLORIDE 137 UG/1
SPRAY, METERED NASAL
COMMUNITY
Start: 2023-09-08

## 2023-09-28 RX ORDER — CABERGOLINE 0.5 MG/1
0.5 TABLET ORAL 2 TIMES WEEKLY
Qty: 24 TABLET | Refills: 4 | Status: SHIPPED | OUTPATIENT
Start: 2023-09-28

## 2023-09-28 NOTE — PROGRESS NOTES
Gynecologic Annual Exam Note          GYN Annual Exam     Gynecologic Exam        Subjective     HPI  Cayla Saldana is a 47 y.o. female, , who presents for annual well woman exam as a established patient . Patient's last menstrual period was 09/15/2023 (exact date)..  Patient reports problems with:  abnormal bleeding .  In April and May 2023, she experienced heavy periods with clots that lasted up to 10 days. In the first 3-4 days, heavy bleeding that saturated ultra tampon in an hour. After day 4, it was moderate bleeding. She experienced dysmenorrhea that were severe for about 3 days. In  and 2023, she went about 48 days without a period. In Aug 2023, she had 2 periods that both lasted 4 days and were about 6 days apart.  Patient had her progesterone l dose switched in 2023 to 400 mg each night.,  In September, she had a normal period with light/moderate bleeding that lasted 4 days. She reports dysmenorrhea that was moderate lasting the first 1-2 days of flow and taking ibuprofen.     Partner Status: Marital Status: . She is is sexually active. She has not had new partners.. STD testing recommendations have been explained to the patient and she does not desire STD testing. There were no changes to her medical or surgical history since her last visit..       Additional OB/GYN History   Current contraception: contraceptive methods: Vasectomy   Desires to: do not start contraception    Last Pap : 9/3/2021. Result: negative. HPV: negative.   Last Completed Pap Smear            PAP SMEAR (Every 3 Years) Next due on 2021  SCANNED - PAP SMEAR    2020  Pap IG, Rfx HPV ASCU    2020  Done - negative                  History of abnormal Pap smear: yes - 2016 LEEP  Family history of uterine, colon, breast, or ovarian cancer: yes - Maternal Aunt- breast cancer  Performs monthly Self-Breast Exam: yes  Last mammogram: 2023. Done at .    Last Completed  Mammogram            MAMMOGRAM (Yearly) Next due on 2023  Mammo Screening Digital Tomosynthesis Bilateral With CAD    2021  Mammo Screening Digital Tomosynthesis Bilateral With CAD    10/30/2020  Mammo Screening Digital Tomosynthesis Bilateral With CAD    2019  Mammo Screening Digital Tomosynthesis Bilateral With CAD    2018  Mammo Screening Digital Tomosynthesis Bilateral With CAD    Only the first 5 history entries have been loaded, but more history exists.                    History of abnormal mammogram: no    Colonoscopy: has had a colonoscopy 4 month(s) ago. @- normal  Exercises Regularly: yes  Feelings of Anxiety or Depression:  well controlled -wellbutrin  Tobacco Usage?: No       Current Outpatient Medications:     Azelastine HCl 137 MCG/SPRAY solution, 1-2 SPRAY INTO BOTH NOSTRILS TWICE A DAY MAY USE 1-2 TIMES PER DAY, Disp: , Rfl:     buPROPion SR (WELLBUTRIN SR) 150 MG 12 hr tablet, Take 1 tablet by mouth Daily., Disp: 90 tablet, Rfl: 4    cabergoline (DOSTINEX) 0.5 MG tablet, Take 1 tablet by mouth 2 (Two) Times a Week., Disp: 24 tablet, Rfl: 4    ibuprofen (ADVIL,MOTRIN) 600 MG tablet, Take 1 tablet by mouth Every 6 (Six) Hours As Needed for Mild Pain., Disp: 40 tablet, Rfl: 1    fluticasone (FLONASE) 50 MCG/ACT nasal spray, 2 sprays into the nostril(s) as directed by provider Daily., Disp: 16 g, Rfl: 0    Loratadine 10 MG capsule, Take 10 mg/day by mouth daily., Disp: , Rfl:     NP Thyroid 30 MG tablet, , Disp: , Rfl:     Progesterone Micronized (PROGESTERONE PO), Take 250 mg by mouth., Disp: , Rfl:     Retin-A 0.1 % cream, APPLY TOPICALLY AT BEDTIME, Disp: , Rfl:     Testosterone 20 % cream, 4 mg., Disp: , Rfl:      Patient is requesting refills of Wellbutrin, Dostinex, and Ibuprofen.    OB History          3    Para   3    Term   3            AB        Living   3         SAB        IAB        Ectopic        Molar        Multiple        Live  "Births                    Past Medical History:   Diagnosis Date    Abnormal Pap smear of cervix 2012    Environmental and seasonal allergies     History of abnormal cervical Pap smear     2017 ASCUS non 16/18 +;  2016 ASCUS non 16/18 +    Ovarian cyst 1990    Pituitary microadenoma     Thyroid disease     Varicella 1987        Past Surgical History:   Procedure Laterality Date    COLPOSCOPY      2013 and 2016    DENTAL PROCEDURE      DIAGNOSTIC LAPAROSCOPY      DILATATION AND CURETTAGE      LEEP  2016    OTHER SURGICAL HISTORY      TUMMY TUCK    VAGINAL DELIVERY      WISDOM TOOTH EXTRACTION  1990       Health Maintenance   Topic Date Due    TDAP/TD VACCINES (1 - Tdap) Never done    HEPATITIS C SCREENING  Never done    ANNUAL PHYSICAL  Never done    COVID-19 Vaccine (4 - 2023-24 season) 09/01/2023    Annual Gynecologic Pelvic and Breast Exam  09/28/2023    INFLUENZA VACCINE  10/01/2023    MAMMOGRAM  01/11/2024    PAP SMEAR  09/23/2024    COLORECTAL CANCER SCREENING  12/03/2031    Pneumococcal Vaccine 0-64  Aged Out       The additional following portions of the patient's history were reviewed and updated as appropriate: allergies, current medications, past family history, past medical history, past social history, past surgical history, and problem list.    Review of Systems   Constitutional: Negative.    HENT: Negative.     Eyes: Negative.    Respiratory: Negative.     Cardiovascular: Negative.    Gastrointestinal: Negative.    Endocrine: Negative.    Genitourinary:  Positive for menstrual problem.   Musculoskeletal: Negative.    Skin: Negative.    Allergic/Immunologic: Negative.    Neurological: Negative.    Hematological: Negative.    Psychiatric/Behavioral: Negative.         I have reviewed and agree with the HPI, ROS, and historical information as entered above. Ashlee Panda MD          Objective   /76   Ht 172.7 cm (68\")   Wt 62.6 kg (138 lb)   LMP 09/15/2023 (Exact Date)   BMI 20.98 kg/m² "     Physical Exam  Vitals and nursing note reviewed. Exam conducted with a chaperone present.   Constitutional:       Appearance: She is well-developed.   HENT:      Head: Normocephalic and atraumatic.   Neck:      Thyroid: No thyroid mass or thyromegaly.   Cardiovascular:      Rate and Rhythm: Normal rate and regular rhythm.      Heart sounds: No murmur heard.  Pulmonary:      Effort: Pulmonary effort is normal. No retractions.      Breath sounds: Normal breath sounds. No wheezing, rhonchi or rales.   Chest:      Chest wall: No mass or tenderness.   Breasts:     Right: Normal. No mass, nipple discharge, skin change or tenderness.      Left: Normal. No mass, nipple discharge, skin change or tenderness.   Abdominal:      General: Bowel sounds are normal.      Palpations: Abdomen is soft. Abdomen is not rigid. There is no mass.      Tenderness: There is no abdominal tenderness. There is no guarding.      Hernia: No hernia is present. There is no hernia in the left inguinal area or right inguinal area.   Genitourinary:     General: Normal vulva.      Exam position: Lithotomy position.      Pubic Area: No rash.       Labia:         Right: No rash, tenderness or lesion.         Left: No rash, tenderness or lesion.       Urethra: No urethral pain or urethral swelling.      Vagina: Normal. No vaginal discharge or lesions.      Cervix: No cervical motion tenderness, discharge, lesion or cervical bleeding.      Uterus: Normal. Not enlarged, not fixed and not tender.       Adnexa:         Right: No mass, tenderness or fullness.          Left: No mass, tenderness or fullness.        Rectum: No external hemorrhoid.   Musculoskeletal:      Cervical back: Normal range of motion. No muscular tenderness.   Neurological:      Mental Status: She is alert and oriented to person, place, and time.   Psychiatric:         Behavior: Behavior normal.          Assessment and Plan    Problem List Items Addressed This Visit           Genitourinary and Reproductive     Dysmenorrhea    Overview     Uses ibuprofen. Understands risks of overuse.          Relevant Medications    ibuprofen (ADVIL,MOTRIN) 600 MG tablet    History of abnormal cervical Pap smear    Overview     High-grade lesion requiring a LEEP procedure in 2014.  Clear margins.         Menstrual irregularity    Overview     Most likely menopausal transition, however patient has a history of microadenoma.  We will test MRI to make sure it is not worsening and prolactin level.  Prolactin within normal limits.  No evidence of menopause.  TSH depressed.  Recommend follow-up with endocrine.  October 2021-no microadenoma seen.  9/28/2023-patient reporting menstrual irregularity where she is skipping periods at times and having periods every 2 weeks at times.  We will check labs today.  Thyroid has been stable for greater than 2 years without need of change.  She was increased to progesterone in August, however her bleeding appears to be unrelated.  Last MRI was in 2021 for her due to her adenoma.  We will continue the Dostinex, check labs, and get an ultrasound.  Once all information is collected we will review.         Relevant Orders    CBC & Differential    Follicle Stimulating Hormone    Estradiol    Prolactin    US Non-ob Transvaginal       Hematology and Neoplasia    Pituitary microadenoma    Overview     On dostinex. Stable. Prolactin checked 9/2/20  MRI in October 2021-negative for adenoma.         Relevant Medications    cabergoline (DOSTINEX) 0.5 MG tablet       Mental Health    Anxiety    Overview     Stable on well butrin         Relevant Medications    buPROPion SR (WELLBUTRIN SR) 150 MG 12 hr tablet     Other Visit Diagnoses       Women's annual routine gynecological examination    -  Primary            GYN annual well woman exam.   Pap guidelines reviewed.  Reviewed monthly self breast exams.  Instructed to call with lumps, pain, or breast discharge.    Ordered Mammogram  today  Reviewed exercise as a preventative health measures.   Reccommended Flu Vaccine in Fall of each year.  Symptoms of menopausal transition reviewed with patient.   RTC in 1 year or PRN with problems.  Return in about 4 weeks (around 10/26/2023) for ultrasound.     Ashlee Panda MD  09/28/2023

## 2023-09-29 LAB
BASOPHILS # BLD AUTO: 0.02 10*3/MM3 (ref 0–0.2)
BASOPHILS NFR BLD AUTO: 0.3 % (ref 0–1.5)
EOSINOPHIL # BLD AUTO: 0.05 10*3/MM3 (ref 0–0.4)
EOSINOPHIL NFR BLD AUTO: 0.7 % (ref 0.3–6.2)
ERYTHROCYTE [DISTWIDTH] IN BLOOD BY AUTOMATED COUNT: 12.3 % (ref 12.3–15.4)
ESTRADIOL SERPL-MCNC: 143 PG/ML
FSH SERPL-ACNC: 5 MIU/ML
HCT VFR BLD AUTO: 42.9 % (ref 34–46.6)
HGB BLD-MCNC: 14.6 G/DL (ref 12–15.9)
IMM GRANULOCYTES # BLD AUTO: 0.02 10*3/MM3 (ref 0–0.05)
IMM GRANULOCYTES NFR BLD AUTO: 0.3 % (ref 0–0.5)
LYMPHOCYTES # BLD AUTO: 1.75 10*3/MM3 (ref 0.7–3.1)
LYMPHOCYTES NFR BLD AUTO: 25.4 % (ref 19.6–45.3)
MCH RBC QN AUTO: 29.1 PG (ref 26.6–33)
MCHC RBC AUTO-ENTMCNC: 34 G/DL (ref 31.5–35.7)
MCV RBC AUTO: 85.5 FL (ref 79–97)
MONOCYTES # BLD AUTO: 0.61 10*3/MM3 (ref 0.1–0.9)
MONOCYTES NFR BLD AUTO: 8.9 % (ref 5–12)
NEUTROPHILS # BLD AUTO: 4.44 10*3/MM3 (ref 1.7–7)
NEUTROPHILS NFR BLD AUTO: 64.4 % (ref 42.7–76)
NRBC BLD AUTO-RTO: 0 /100 WBC (ref 0–0.2)
PLATELET # BLD AUTO: 245 10*3/MM3 (ref 140–450)
PROLACTIN SERPL-MCNC: 11.2 NG/ML (ref 4.8–23.3)
RBC # BLD AUTO: 5.02 10*6/MM3 (ref 3.77–5.28)
WBC # BLD AUTO: 6.89 10*3/MM3 (ref 3.4–10.8)

## 2023-10-26 ENCOUNTER — OFFICE VISIT (OUTPATIENT)
Dept: OBSTETRICS AND GYNECOLOGY | Facility: CLINIC | Age: 48
End: 2023-10-26
Payer: MEDICAID

## 2023-10-26 VITALS
SYSTOLIC BLOOD PRESSURE: 108 MMHG | WEIGHT: 138.6 LBS | BODY MASS INDEX: 21.01 KG/M2 | HEIGHT: 68 IN | DIASTOLIC BLOOD PRESSURE: 74 MMHG

## 2023-10-26 DIAGNOSIS — N94.6 DYSMENORRHEA: Primary | ICD-10-CM

## 2023-10-26 DIAGNOSIS — N83.202 LEFT OVARIAN CYST: ICD-10-CM

## 2023-10-26 DIAGNOSIS — D25.9 UTERINE LEIOMYOMA, UNSPECIFIED LOCATION: ICD-10-CM

## 2023-10-26 DIAGNOSIS — N88.8 NABOTHIAN CYST: ICD-10-CM

## 2023-10-26 DIAGNOSIS — N92.6 MENSTRUAL IRREGULARITY: ICD-10-CM

## 2023-10-26 NOTE — PROGRESS NOTES
Chief Complaint   Patient presents with    Follow-up         Subjective   HPI  Cayla Saldana is a 47 y.o. female, , her last LMP was Patient's last menstrual period was 10/19/2023 (exact date)..  She presents for a follow up evaluation of Abnormal Uterine Bleeding and Menorrhagia. The patient was last seen on 23 by Ashlee Panda MD for her annual. At that time she reported menstrual irregularity where she is skipping periods at times and having periods every 2 weeks at times. . She had labs drawn at last visit and US performed today. Her prolactin, estradiol, FSH, CBC were all wnl on 23.The plan was expectant management. Since the last visit the patient reports the plan has remained unchanged. This has been an issue in the past. The patient reports additional symptoms as none.      Pt states her las period was from 10/19-10/22, which consisted of spotting dark blood.     US done today: Yes.  Findings showed two uterine fibroids, left ovarian cyst and nabothian cyst on cervix.  Pending physician review. SUKH Harper      Additional OB/GYN History   Last Pap : 21 neg, HPV neg  Last Completed Pap Smear            PAP SMEAR (Every 3 Years) Next due on 2021  SCANNED - PAP SMEAR    2020  Pap IG, Rfx HPV ASCU    2020  Done - negative                  History of abnormal Pap smear: yes -  LEEP, clear margins  Tobacco Usage?: No       Current Outpatient Medications:     Azelastine HCl 137 MCG/SPRAY solution, 1-2 SPRAY INTO BOTH NOSTRILS TWICE A DAY MAY USE 1-2 TIMES PER DAY, Disp: , Rfl:     buPROPion SR (WELLBUTRIN SR) 150 MG 12 hr tablet, Take 1 tablet by mouth Daily., Disp: 90 tablet, Rfl: 4    cabergoline (DOSTINEX) 0.5 MG tablet, Take 1 tablet by mouth 2 (Two) Times a Week., Disp: 24 tablet, Rfl: 4    fluticasone (FLONASE) 50 MCG/ACT nasal spray, 2 sprays into the nostril(s) as directed by provider Daily., Disp: 16 g, Rfl: 0    ibuprofen  "(ADVIL,MOTRIN) 600 MG tablet, Take 1 tablet by mouth Every 6 (Six) Hours As Needed for Mild Pain., Disp: 40 tablet, Rfl: 1    Loratadine 10 MG capsule, Take 1 capsule by mouth Daily., Disp: , Rfl:     NP Thyroid 30 MG tablet, , Disp: , Rfl:     Progesterone Micronized (PROGESTERONE PO), Take 400 mg by mouth., Disp: , Rfl:     Retin-A 0.1 % cream, APPLY TOPICALLY AT BEDTIME, Disp: , Rfl:     Testosterone 20 % cream, 4 mg., Disp: , Rfl:      Past Medical History:   Diagnosis Date    Abnormal Pap smear of cervix 2012    Environmental and seasonal allergies     History of abnormal cervical Pap smear     2017 ASCUS non 16/18 +;  2016 ASCUS non 16/18 +    Ovarian cyst 1990    Pituitary microadenoma     Thyroid disease     Varicella 1987        Past Surgical History:   Procedure Laterality Date    COLPOSCOPY      2013 and 2016    DENTAL PROCEDURE      DIAGNOSTIC LAPAROSCOPY      DILATATION AND CURETTAGE      LEEP  2016    OTHER SURGICAL HISTORY      TUMMY TUCK    VAGINAL DELIVERY      WISDOM TOOTH EXTRACTION  1990       The additional following portions of the patient's history were reviewed and updated as appropriate: allergies, current medications, past family history, past medical history, past social history, past surgical history, and problem list.    Review of Systems   Constitutional: Negative.    HENT: Negative.     Eyes: Negative.    Respiratory: Negative.     Cardiovascular: Negative.    Gastrointestinal: Negative.    Endocrine: Negative.    Genitourinary:  Positive for menstrual problem and vaginal bleeding.   Musculoskeletal: Negative.    Skin: Negative.    Allergic/Immunologic: Negative.    Neurological: Negative.    Hematological: Negative.    Psychiatric/Behavioral: Negative.         I have reviewed and agree with the HPI, ROS, and historical information as entered above. Davina Mosley, APRN      Objective   /74   Ht 172.7 cm (68\")   Wt 62.9 kg (138 lb 9.6 oz)   LMP 10/19/2023 (Exact Date)   BMI " 21.07 kg/m²     Physical Exam  Vitals and nursing note reviewed.   Constitutional:       General: She is not in acute distress.     Appearance: Normal appearance. She is not ill-appearing, toxic-appearing or diaphoretic.   Cardiovascular:      Rate and Rhythm: Normal rate.      Pulses: Normal pulses.   Pulmonary:      Effort: Pulmonary effort is normal. No respiratory distress.      Breath sounds: Normal breath sounds.   Neurological:      Mental Status: She is alert and oriented to person, place, and time.   Psychiatric:         Mood and Affect: Mood normal.         Behavior: Behavior normal.         Thought Content: Thought content normal.         Judgment: Judgment normal.         Assessment & Plan     Assessment     Problem List Items Addressed This Visit          Genitourinary and Reproductive     Dysmenorrhea - Primary    Overview     Uses ibuprofen. Understands risks of overuse.          Relevant Medications    ibuprofen (ADVIL,MOTRIN) 600 MG tablet    Other Relevant Orders    US Non-ob Transvaginal    Menstrual irregularity    Overview     Most likely menopausal transition, however patient has a history of microadenoma.  We will test MRI to make sure it is not worsening and prolactin level.  Prolactin within normal limits.  No evidence of menopause.  TSH depressed.  Recommend follow-up with endocrine.  October 2021-no microadenoma seen.  9/28/2023-patient reporting menstrual irregularity where she is skipping periods at times and having periods every 2 weeks at times.  We will check labs today.  Prolactin level within normal limits.  No evidence of anemia.  FSH and estradiol are not consistent with menopause on the [place].  Thyroid has been stable for greater than 2 years without need of change.  She was increased to progesterone in August, however her bleeding appears to be unrelated.  Last MRI was in 2021 for her due to her adenoma.  We will continue the Dostinex, check labs, and get an ultrasound.  Once  all information is collected we will review.         Relevant Orders    US Non-ob Transvaginal    Nabothian cyst    Overview     10/26/23 Cervix nabothian cyst         Relevant Orders    US Non-ob Transvaginal    Uterine leiomyoma    Overview     10/26/23   1. 63fbq22.9mmx15.8mm anterior  2. 10.9mm x 7,1mm x 11.2mm posterior           Relevant Orders    US Non-ob Transvaginal    Left ovarian cyst    Overview     10/26/23 27.7mm x 18.3mm x 19.2mm simple cyst  No free fluid         Relevant Orders    US Non-ob Transvaginal         Plan     Call for heavy bleeding  Discussed potential etiologies and treatment options.   Uterine leiomyomata - discussed options for management of AUB including OCPs, IUD and cyclic progesterone versus definitive therapy. Understands 1/1000 risk of leiomyosarcoma.  Symptomatic uterine leiomyomata- reviewed options  Patient would like to continue with current progesterone regimen right now as her bleeding has improved.   Follow up in 6 months for repeat ultrasound      Davina Mosley, APRN  10/26/2023

## 2023-11-29 ENCOUNTER — TRANSCRIBE ORDERS (OUTPATIENT)
Dept: ADMINISTRATIVE | Facility: HOSPITAL | Age: 48
End: 2023-11-29
Payer: MEDICAID

## 2023-11-29 DIAGNOSIS — Z12.31 VISIT FOR SCREENING MAMMOGRAM: Primary | ICD-10-CM

## 2024-01-18 ENCOUNTER — HOSPITAL ENCOUNTER (OUTPATIENT)
Dept: MAMMOGRAPHY | Facility: HOSPITAL | Age: 49
Discharge: HOME | End: 2024-01-18
Admitting: OBSTETRICS & GYNECOLOGY
Payer: MEDICAID

## 2024-01-18 DIAGNOSIS — Z12.31 VISIT FOR SCREENING MAMMOGRAM: ICD-10-CM

## 2024-01-18 PROCEDURE — 77067 SCR MAMMO BI INCL CAD: CPT

## 2024-01-18 PROCEDURE — 77063 BREAST TOMOSYNTHESIS BI: CPT

## 2024-04-25 ENCOUNTER — OFFICE VISIT (OUTPATIENT)
Dept: OBSTETRICS AND GYNECOLOGY | Facility: CLINIC | Age: 49
End: 2024-04-25
Payer: MEDICAID

## 2024-04-25 VITALS — BODY MASS INDEX: 21.01 KG/M2 | WEIGHT: 138.2 LBS | DIASTOLIC BLOOD PRESSURE: 64 MMHG | SYSTOLIC BLOOD PRESSURE: 88 MMHG

## 2024-04-25 DIAGNOSIS — N83.202 LEFT OVARIAN CYST: Primary | ICD-10-CM

## 2024-04-25 DIAGNOSIS — N92.6 MENSTRUAL IRREGULARITY: ICD-10-CM

## 2024-04-25 DIAGNOSIS — D25.9 UTERINE LEIOMYOMA, UNSPECIFIED LOCATION: ICD-10-CM

## 2024-04-25 RX ORDER — CLOBETASOL PROPIONATE 0.5 MG/G
OINTMENT TOPICAL
COMMUNITY
Start: 2024-04-11

## 2024-04-25 NOTE — PROGRESS NOTES
Chief Complaint   Patient presents with    Follow-up       Subjective   HPI  Cayla Saldana is a 48 y.o. female, . Her last LMP was Patient's last menstrual period was 2024.. who presents for follow up on fibroids, AUB, ovarian cyst, Nabothian cyst.    At her last visit she was treated with  expectant management and labs . Prolactin level was 11.2. Since then she reports her symptoms/issue has remained unchanged. Patient reports that she had 3 periods last month. Patient states that each one lasted about 3 days that are described as spotting. Patient is currently menstruating and the first day was 24 and is brown spotting. The patient reports additional symptoms as  fatigue for one day when she is on her period .  Patient states that she has night sweats when she is on her period. Patient has occasional vaginal dryness. Patient had ultrasound done today.     US done today: Yes.  Findings showed Uterus normal size and shape with an endometrial thickness of 2.7 mm.  3 small fibroids seen 2 on the anterior wall one of the posterior wall all less than 2 cm.  Left ovarian cyst seen on 10/26/2023 similar in size today.  It measures 23 x 17 x 22 mm.  Previously it measured 28 x 18 x 19 mm..  I have personally evaluated the U/S and agree with the findings. Ashlee Panda MD    Additional OB/GYN History     Last Pap : 21 negative, HPV pool negative  Last Completed Pap Smear            PAP SMEAR (Every 3 Years) Next due on 2021  SCANNED - PAP SMEAR    2020  Pap IG, Rfx HPV ASCU    2020  Done - negative                    Last mammogram: 2024 benign  Last Completed Mammogram            MAMMOGRAM (Yearly) Next due on 2024  Mammo Screening Digital Tomosynthesis Bilateral With CAD    2023  Mammo Screening Digital Tomosynthesis Bilateral With CAD    2021  Mammo Screening Digital Tomosynthesis Bilateral With CAD     10/30/2020  Mammo Screening Digital Tomosynthesis Bilateral With CAD    2019  Mammo Screening Digital Tomosynthesis Bilateral With CAD    Only the first 5 history entries have been loaded, but more history exists.                    Tobacco Usage?: No   OB History          3    Para   3    Term   3            AB        Living   3         SAB        IAB        Ectopic        Molar        Multiple        Live Births   3                  Current Outpatient Medications:     Azelastine HCl 137 MCG/SPRAY solution, 1-2 SPRAY INTO BOTH NOSTRILS TWICE A DAY MAY USE 1-2 TIMES PER DAY, Disp: , Rfl:     buPROPion SR (WELLBUTRIN SR) 150 MG 12 hr tablet, Take 1 tablet by mouth Daily., Disp: 90 tablet, Rfl: 4    cabergoline (DOSTINEX) 0.5 MG tablet, Take 1 tablet by mouth 2 (Two) Times a Week., Disp: 24 tablet, Rfl: 4    clobetasol (TEMOVATE) 0.05 % ointment, APPLY TO AFFECTED AREAS TWICE A DAY FOR UP TO 2WKS. TAKE A WK BREAK. THEN REPEAT AS NEED FOR FLARES, Disp: , Rfl:     fluticasone (FLONASE) 50 MCG/ACT nasal spray, 2 sprays into the nostril(s) as directed by provider Daily., Disp: 16 g, Rfl: 0    ibuprofen (ADVIL,MOTRIN) 600 MG tablet, Take 1 tablet by mouth Every 6 (Six) Hours As Needed for Mild Pain., Disp: 40 tablet, Rfl: 1    Loratadine 10 MG capsule, Take 1 capsule by mouth Daily., Disp: , Rfl:     NP Thyroid 30 MG tablet, , Disp: , Rfl:     Progesterone Micronized (PROGESTERONE PO), Take 400 mg by mouth., Disp: , Rfl:     Retin-A 0.1 % cream, APPLY TOPICALLY AT BEDTIME, Disp: , Rfl:     Testosterone 20 % cream, 4 mg., Disp: , Rfl:     metFORMIN (GLUCOPHAGE) 1000 MG tablet, Take 1 tablet by mouth Daily With Breakfast., Disp: , Rfl:      Past Medical History:   Diagnosis Date    Abnormal Pap smear of cervix     Environmental and seasonal allergies     History of abnormal cervical Pap smear      ASCUS non 16/18 +;   ASCUS non 16/18 +    Ovarian cyst     Pituitary microadenoma     Thyroid  disease     Varicella 1987        Past Surgical History:   Procedure Laterality Date    COLPOSCOPY      2013 and 2016    DENTAL PROCEDURE      DIAGNOSTIC LAPAROSCOPY      DILATATION AND CURETTAGE      LEEP  2016    OTHER SURGICAL HISTORY      TUMMY TUCK    VAGINAL DELIVERY      WISDOM TOOTH EXTRACTION  1990       The additional following portions of the patient's history were reviewed and updated as appropriate: allergies and current medications.    Review of Systems   Constitutional: Negative.    HENT: Negative.     Eyes: Negative.    Respiratory: Negative.     Cardiovascular: Negative.    Gastrointestinal: Negative.    Endocrine: Negative.    Genitourinary:  Positive for menstrual problem.   Musculoskeletal: Negative.    Allergic/Immunologic: Negative.    Neurological: Negative.    Hematological: Negative.    Psychiatric/Behavioral: Negative.         I have reviewed and agree with the HPI, ROS, and historical information as entered above. Ashlee Panda MD      Objective   BP (!) 88/64   Wt 62.7 kg (138 lb 3.2 oz)   LMP 04/23/2024   BMI 21.01 kg/m²     Physical Exam  Vitals and nursing note reviewed.   Constitutional:       Appearance: Normal appearance. She is well-developed.   HENT:      Head: Normocephalic and atraumatic.   Pulmonary:      Effort: Pulmonary effort is normal.      Breath sounds: Normal breath sounds.   Abdominal:      General: There is no distension.      Palpations: Abdomen is soft. Abdomen is not rigid. There is no mass.      Tenderness: There is no abdominal tenderness. There is no guarding or rebound.   Musculoskeletal:      Cervical back: Normal range of motion.   Skin:     General: Skin is warm and dry.   Neurological:      Mental Status: She is alert and oriented to person, place, and time.   Psychiatric:         Mood and Affect: Mood normal.         Behavior: Behavior normal.         Assessment & Plan     Assessment     Problem List Items Addressed This Visit           Genitourinary and Reproductive     Menstrual irregularity    Overview     Most likely menopausal transition, however patient has a history of microadenoma.  We will test MRI to make sure it is not worsening and prolactin level.  Prolactin within normal limits.  No evidence of menopause.  TSH depressed.  Recommend follow-up with endocrine.  October 2021-no microadenoma seen.  9/28/2023-patient reporting menstrual irregularity where she is skipping periods at times and having periods every 2 weeks at times.  We will check labs today.  Prolactin level within normal limits.  No evidence of anemia.  FSH and estradiol are not consistent with menopause on the [place].  Thyroid has been stable for greater than 2 years without need of change.  She was increased to progesterone in August, however her bleeding appears to be unrelated.  Last MRI was in 2021 for her due to her adenoma.  We will continue the Dostinex, check labs, and get an ultrasound.  Once all information is collected we will review.  4/25/24-patient still with occasional menstrual irregularity.  She is taking progesterone with an outside source.  Ultrasound today stable with 3 small fibroids.  Lining 2.7 mm.  We discussed the pros and cons of endometrial sampling but with the lining this thin we elected not to proceed.  Most likely bleeding is related to progesterone effect.  Will repeat ultrasound with annual.         Uterine leiomyoma    Overview     10/26/23   1. 83grg89.9mmx15.8mm anterior  2. 10.9mm x 7,1mm x 11.2mm posterior           Relevant Orders    US Non-ob Transvaginal    Left ovarian cyst - Primary    Overview     10/26/23 27.7mm x 18.3mm x 19.2mm simple cyst  No free fluid         Relevant Orders    US Non-ob Transvaginal           Plan       Return in about 6 months (around 10/25/2024) for Annual physical, ultrasound.    Total time spent today with Cayla  was 20 minutes (level 3).  Off this time, > 50% was spent face-to-face time coordinating  care, answering her questions and counseling regarding pathophysiology of her presenting problem along with plans for any diagnositc work-up and treatment.      Ashlee Panda MD  04/25/2024

## 2024-10-10 ENCOUNTER — OFFICE VISIT (OUTPATIENT)
Dept: OBSTETRICS AND GYNECOLOGY | Facility: CLINIC | Age: 49
End: 2024-10-10
Payer: MEDICAID

## 2024-10-10 VITALS
HEIGHT: 68 IN | BODY MASS INDEX: 21.61 KG/M2 | SYSTOLIC BLOOD PRESSURE: 98 MMHG | WEIGHT: 142.6 LBS | DIASTOLIC BLOOD PRESSURE: 70 MMHG

## 2024-10-10 DIAGNOSIS — Z12.31 BREAST CANCER SCREENING BY MAMMOGRAM: ICD-10-CM

## 2024-10-10 DIAGNOSIS — D35.2 PITUITARY MICROADENOMA: ICD-10-CM

## 2024-10-10 DIAGNOSIS — Z01.419 PAP TEST, AS PART OF ROUTINE GYNECOLOGICAL EXAMINATION: ICD-10-CM

## 2024-10-10 DIAGNOSIS — D25.9 UTERINE LEIOMYOMA, UNSPECIFIED LOCATION: ICD-10-CM

## 2024-10-10 DIAGNOSIS — Z01.419 WOMEN'S ANNUAL ROUTINE GYNECOLOGICAL EXAMINATION: Primary | ICD-10-CM

## 2024-10-10 DIAGNOSIS — F41.9 ANXIETY: ICD-10-CM

## 2024-10-10 RX ORDER — CABERGOLINE 0.5 MG/1
0.5 TABLET ORAL 2 TIMES WEEKLY
Qty: 24 TABLET | Refills: 4 | Status: SHIPPED | OUTPATIENT
Start: 2024-10-10

## 2024-10-10 RX ORDER — BUPROPION HYDROCHLORIDE 150 MG/1
150 TABLET, EXTENDED RELEASE ORAL DAILY
Qty: 90 TABLET | Refills: 3 | Status: SHIPPED | OUTPATIENT
Start: 2024-10-10

## 2024-10-10 NOTE — PROGRESS NOTES
Gynecologic Annual Exam Note          GYN Annual Exam     Gynecologic Exam        Subjective     HPI  Cayla Saldana is a 48 y.o. female, , who presents for annual well woman exam as a established patient. There were no changes to her medical or surgical history since her last visit..  Patient's last menstrual period was 2024 (exact date).  Her periods are irregular, occurring approximately every 40, lasting 4 days  The flow is heavy with quarter-sized clots, saturating a tampon every 1hour for the first 2 days. She reports dysmenorrhea is moderate-severe occurring first 1-2 days of flow. Marital Status: . She is sexually active. She has not had new partners.. STD testing recommendations have been explained to the patient and she declines STD testing.    The patient would like to discuss the following complaints today: pt had TVUS for f/u on left ovarian cyst and fibroids.   She takes daily progesterone per a functional medicine provider.  When bleeding is heavy, she takes extra which helps with heaviness.    Additional OB/GYN History   contraceptive methods: Vasectomy   Desires to: do not start contraception  History of migraines: no    Last Pap : 21. Result: negative. HPV: negative.     History of abnormal Pap smear: yes - 2016 LEEP  Family history of uterine, colon, breast, or ovarian cancer: yes - mother & maternal aunt--breast ca  Performs monthly Self-Breast Exam: yes  Last mammogram: 24. Done at . There is a copy in the chart. Continue annual screening.    Last Completed Mammogram            Ordered - MAMMOGRAM (Yearly) Ordered on 10/10/2024      2024  Mammo Screening Digital Tomosynthesis Bilateral With CAD    2023  Mammo Screening Digital Tomosynthesis Bilateral With CAD    2021  Mammo Screening Digital Tomosynthesis Bilateral With CAD    10/30/2020  Mammo Screening Digital Tomosynthesis Bilateral With CAD    2019  Mammo Screening Digital  Tomosynthesis Bilateral With CAD    Only the first 5 history entries have been loaded, but more history exists.                    Colonoscopy: has had a colonoscopy 3 years ago. Repeat in 7 years.  Exercises Regularly: yes  Feelings of Anxiety or Depression: yes - well-controlled  Tobacco Usage?: No         Current Outpatient Medications:     Azelastine HCl 137 MCG/SPRAY solution, 1-2 SPRAY INTO BOTH NOSTRILS TWICE A DAY MAY USE 1-2 TIMES PER DAY, Disp: , Rfl:     buPROPion SR (WELLBUTRIN SR) 150 MG 12 hr tablet, Take 1 tablet by mouth Daily., Disp: 90 tablet, Rfl: 3    cabergoline (DOSTINEX) 0.5 MG tablet, Take 1 tablet by mouth 2 (Two) Times a Week., Disp: 24 tablet, Rfl: 4    fluticasone (FLONASE) 50 MCG/ACT nasal spray, 2 sprays into the nostril(s) as directed by provider Daily., Disp: 16 g, Rfl: 0    ibuprofen (ADVIL,MOTRIN) 600 MG tablet, Take 1 tablet by mouth Every 6 (Six) Hours As Needed for Mild Pain., Disp: 40 tablet, Rfl: 1    Loratadine 10 MG capsule, Take 1 capsule by mouth Daily., Disp: , Rfl:     metFORMIN (GLUCOPHAGE) 1000 MG tablet, Take 1 tablet by mouth Daily With Breakfast., Disp: , Rfl:     Progesterone Micronized (PROGESTERONE PO), Take 400 mg by mouth., Disp: , Rfl:     Retin-A 0.1 % cream, APPLY TOPICALLY AT BEDTIME, Disp: , Rfl:     Testosterone 20 % cream, 4 mg., Disp: , Rfl:      Patient is requesting refills of Wellbutrin & Dostinex .    OB History          3    Para   3    Term   3            AB        Living   3         SAB        IAB        Ectopic        Molar        Multiple        Live Births   3                Past Medical History:   Diagnosis Date    Abnormal Pap smear of cervix     Environmental and seasonal allergies     History of abnormal cervical Pap smear     2017 ASCUS non 16/18 +;  2016 ASCUS non 16/18 +    Ovarian cyst     Pituitary microadenoma     Thyroid disease     Varicella 1987        Past Surgical History:   Procedure Laterality Date     "COLPOSCOPY      2013 and 2016    DENTAL PROCEDURE      DIAGNOSTIC LAPAROSCOPY      DILATATION AND CURETTAGE      LEEP  2016    OTHER SURGICAL HISTORY      TUMMY TUCK    VAGINAL DELIVERY      WISDOM TOOTH EXTRACTION  1990       Health Maintenance   Topic Date Due    TDAP/TD VACCINES (1 - Tdap) Never done    HEPATITIS C SCREENING  Never done    ANNUAL PHYSICAL  Never done    INFLUENZA VACCINE  Never done    COVID-19 Vaccine (4 - 2023-24 season) 09/01/2024    PAP SMEAR  09/23/2024    Annual Gynecologic Pelvic and Breast Exam  09/29/2024    MAMMOGRAM  01/18/2025    COLORECTAL CANCER SCREENING  12/03/2031    Pneumococcal Vaccine 0-64  Aged Out       The additional following portions of the patient's history were reviewed and updated as appropriate: allergies, current medications, past family history, past medical history, past social history, past surgical history, and problem list.    Review of Systems   Constitutional: Negative.    HENT: Negative.     Eyes: Negative.    Respiratory: Negative.     Cardiovascular: Negative.    Gastrointestinal: Negative.    Endocrine: Negative.    Genitourinary: Negative.    Musculoskeletal: Negative.    Skin: Negative.    Allergic/Immunologic: Negative.    Neurological: Negative.    Hematological: Negative.    Psychiatric/Behavioral: Negative.           I have reviewed and agree with the HPI, ROS, and historical information as entered above. Nette Weston, APRN          Objective   BP 98/70   Ht 172.7 cm (68\")   Wt 64.7 kg (142 lb 9.6 oz)   LMP 09/30/2024 (Exact Date)   BMI 21.68 kg/m²     Physical Exam  Vitals and nursing note reviewed. Exam conducted with a chaperone present.   Constitutional:       General: She is not in acute distress.     Appearance: Normal appearance. She is well-developed and normal weight. She is not ill-appearing.   Neck:      Thyroid: No thyroid mass or thyromegaly.   Pulmonary:      Effort: Pulmonary effort is normal. No respiratory distress or " retractions.   Chest:      Chest wall: No mass.   Breasts:     Right: Normal. No mass, nipple discharge, skin change or tenderness.      Left: Normal. No mass, nipple discharge, skin change or tenderness.   Abdominal:      General: There is no distension.      Palpations: Abdomen is soft. Abdomen is not rigid. There is no mass.      Tenderness: There is no abdominal tenderness. There is no guarding or rebound.      Hernia: No hernia is present. There is no hernia in the left inguinal area.   Genitourinary:     General: Normal vulva.      Labia:         Right: No rash, tenderness or lesion.         Left: No rash, tenderness or lesion.       Vagina: Normal. No vaginal discharge or lesions.      Cervix: Normal.      Uterus: Normal. Not enlarged, not fixed and not tender.       Adnexa: Right adnexa normal and left adnexa normal.        Right: No mass or tenderness.          Left: No mass or tenderness.        Rectum: No external hemorrhoid.   Musculoskeletal:      Cervical back: No muscular tenderness.   Skin:     General: Skin is warm and dry.   Neurological:      Mental Status: She is alert and oriented to person, place, and time.   Psychiatric:         Mood and Affect: Mood normal.         Behavior: Behavior normal.            Assessment and Plan    Problem List Items Addressed This Visit          Genitourinary and Reproductive     Uterine leiomyoma    Overview     10/26/23   1. 91qjj71.9mmx15.8mm anterior  2. 10.9mm x 7,1mm x 11.2mm posterior              Hematology and Neoplasia    Pituitary microadenoma    Overview     On dostinex. Stable. Prolactin checked 9/2/20  MRI in October 2021-negative for adenoma.         Relevant Medications    cabergoline (DOSTINEX) 0.5 MG tablet       Mental Health    Anxiety    Overview     Stable on well butrin         Relevant Medications    buPROPion SR (WELLBUTRIN SR) 150 MG 12 hr tablet     Other Visit Diagnoses       Women's annual routine gynecological examination    -  Primary     Breast cancer screening by mammogram        Relevant Orders    Mammo Screening Digital Tomosynthesis Bilateral With CAD    Pap test, as part of routine gynecological examination        Relevant Orders    LIQUID-BASED PAP SMEAR WITH HPV GENOTYPING REGARDLESS OF INTERPRETATION ([facility])            GYN annual well woman exam.   Pap guidelines reviewed.  Reviewed monthly self breast exams.  Instructed to call with lumps, pain, or breast discharge.    Ordered Mammogram today  Recommended use of Vitamin D replacement and getting adequate calcium in her diet. (1500mg)  Reviewed exercise as a preventative health measures.   Reccommended Flu Vaccine in Fall of each year.  Symptoms of menopausal transition reviewed with patient.   RTC in 1 year or PRN with problems.  Discussed importance of routine colon cancer screening. Reviewed current guidelines. Recommended colonoscopy after age 45.  Return in about 1 year (around 10/10/2025), or if symptoms worsen or fail to improve, for Annual physical.   D/w pt today's US shows 3 stable fibroids and 2 small simple ovarian cysts-- not concerning.  She declines other options at this time for menorrhagia, including Lilliana Weston, APRN  10/10/2024

## 2024-10-11 LAB — REF LAB TEST METHOD: NORMAL

## 2025-01-05 LAB
NCCN CRITERIA FLAG: NORMAL
TYRER CUZICK SCORE: 16.3

## 2025-01-20 ENCOUNTER — HOSPITAL ENCOUNTER (OUTPATIENT)
Dept: MAMMOGRAPHY | Facility: HOSPITAL | Age: 50
Discharge: HOME | End: 2025-01-20
Admitting: NURSE PRACTITIONER
Payer: MEDICAID

## 2025-01-20 DIAGNOSIS — Z12.31 BREAST CANCER SCREENING BY MAMMOGRAM: ICD-10-CM

## 2025-01-20 PROCEDURE — 77067 SCR MAMMO BI INCL CAD: CPT

## 2025-01-20 PROCEDURE — 77063 BREAST TOMOSYNTHESIS BI: CPT

## 2025-01-22 PROCEDURE — 77067 SCR MAMMO BI INCL CAD: CPT | Performed by: RADIOLOGY

## 2025-01-22 PROCEDURE — 77063 BREAST TOMOSYNTHESIS BI: CPT | Performed by: RADIOLOGY
